# Patient Record
Sex: FEMALE | Race: OTHER | HISPANIC OR LATINO | Employment: FULL TIME | URBAN - METROPOLITAN AREA
[De-identification: names, ages, dates, MRNs, and addresses within clinical notes are randomized per-mention and may not be internally consistent; named-entity substitution may affect disease eponyms.]

---

## 2021-12-31 ENCOUNTER — HOSPITAL ENCOUNTER (EMERGENCY)
Facility: HOSPITAL | Age: 56
Discharge: HOME | End: 2021-12-31
Attending: EMERGENCY MEDICINE | Admitting: EMERGENCY MEDICINE

## 2021-12-31 VITALS
HEART RATE: 111 BPM | WEIGHT: 154.54 LBS | RESPIRATION RATE: 18 BRPM | SYSTOLIC BLOOD PRESSURE: 133 MMHG | TEMPERATURE: 97.9 F | DIASTOLIC BLOOD PRESSURE: 79 MMHG | OXYGEN SATURATION: 98 %

## 2021-12-31 DIAGNOSIS — Z20.822 COVID-19 VIRUS TEST RESULT UNKNOWN: ICD-10-CM

## 2021-12-31 DIAGNOSIS — J06.9 VIRAL URI WITH COUGH: Primary | ICD-10-CM

## 2021-12-31 LAB
FLUAV RNA RESP QL NAA+PROBE: NEGATIVE
FLUBV RNA RESP QL NAA+PROBE: NEGATIVE
RSV RNA RESP QL NAA+PROBE: NEGATIVE
SARS-COV-2 RNA RESP QL NAA+PROBE: POSITIVE

## 2021-12-31 PROCEDURE — 99284 EMERGENCY DEPT VISIT MOD MDM: CPT | Performed by: PHYSICIAN ASSISTANT

## 2021-12-31 PROCEDURE — 99283 EMERGENCY DEPT VISIT LOW MDM: CPT

## 2021-12-31 PROCEDURE — 0241U HB NFCT DS VIR RESP RNA 4 TRGT: CPT | Performed by: PHYSICIAN ASSISTANT

## 2021-12-31 RX ORDER — IBUPROFEN 400 MG/1
400 TABLET ORAL EVERY 6 HOURS PRN
Qty: 12 TABLET | Refills: 0 | Status: SHIPPED | OUTPATIENT
Start: 2021-12-31

## 2021-12-31 RX ORDER — GUAIFENESIN/DEXTROMETHORPHAN 100-10MG/5
5 SYRUP ORAL 3 TIMES DAILY PRN
Qty: 118 ML | Refills: 0 | Status: SHIPPED | OUTPATIENT
Start: 2021-12-31 | End: 2022-01-10

## 2021-12-31 RX ORDER — ACETAMINOPHEN 500 MG
500 TABLET ORAL EVERY 6 HOURS PRN
Qty: 30 TABLET | Refills: 0 | Status: SHIPPED | OUTPATIENT
Start: 2021-12-31

## 2021-12-31 RX ORDER — FLUTICASONE PROPIONATE 50 MCG
1 SPRAY, SUSPENSION (ML) NASAL DAILY
Qty: 16 G | Refills: 0 | Status: SHIPPED | OUTPATIENT
Start: 2021-12-31

## 2021-12-31 RX ORDER — ALBUTEROL SULFATE 90 UG/1
2 AEROSOL, METERED RESPIRATORY (INHALATION) EVERY 4 HOURS PRN
Qty: 6.7 G | Refills: 0 | Status: SHIPPED | OUTPATIENT
Start: 2021-12-31

## 2021-12-31 NOTE — ED PROVIDER NOTES
HPI: Patient is a 49 y o  female who presents with 3 days of chills, cough, headache, sore throat, fatigue and myalgias which the patient describes at moderate The patient has had contact with people with similar symptoms  The patient has not taken any medication  Patient IS TRIPLE Vaccinated for COVID-19  No Known Allergies    History reviewed  No pertinent past medical history  History reviewed  No pertinent surgical history  Social History     Tobacco Use    Smoking status: Never Smoker    Smokeless tobacco: Never Used   Substance Use Topics    Alcohol use: Never    Drug use: Never       Nursing notes reviewed  Physical Exam:  ED Triage Vitals [12/31/21 1515]   Temperature Pulse Respirations Blood Pressure SpO2   97 9 °F (36 6 °C) (!) 111 18 133/79 98 %      Temp Source Heart Rate Source Patient Position - Orthostatic VS BP Location FiO2 (%)   Tympanic Monitor Sitting Left arm --      Pain Score       --           ROS: Positive for cough, congestion, body aches, sore throat, the remainder of a 10 organ system ROS was otherwise unremarkable  General: awake, alert, no acute distress  Head: normocephalic, atraumatic  Eyes: no scleral icterus  Ears: external ears normal, hearing grossly intact  Nose: external exam grossly normal, positive nasal discharge  Neck: symmetric, No JVD noted, trachea midline  Pulmonary: no respiratory distress, no tachypnea noted  Cardiovascular: appears well perfused  Abdomen: no distention noted  Musculoskeletal: no deformities noted, tone normal  Neuro: grossly non-focal  Psych: mood and affect appropriate    The patient is stable and has a history and physical exam consistent with a viral illness  COVID19 testing has been performed  I considered the patient's other medical conditions as applicable/noted above in my medical decision making  The patient is stable upon discharge  The plan is for supportive care at home      The patient (and any family present) verbalized understanding of the discharge instructions and warnings that would necessitate return to the Emergency Department  All questions were answered prior to discharge  Medications - No data to display  Final diagnoses:   Viral URI with cough   COVID-19 virus test result unknown     Time reflects when diagnosis was documented in both MDM as applicable and the Disposition within this note     Time User Action Codes Description Comment    12/31/2021  3:20 PM Svitlana Herrera [J06 9] Viral URI with cough     12/31/2021  3:20 PM [facility] [Z20 [id number]] COVID-19 virus test result unknown       ED Disposition     ED Disposition Condition Date/Time Comment    Discharge Good Fri Dec 31, 2021  3:20 PM Jhoan Jones discharge to home/self care              Follow-up Information     Follow up With Specialties Details Why Yves Darby MD Family Medicine Schedule an appointment as soon as possible for a visit in 2 days As needed [phone] [phone]          Patient's Medications   Discharge Prescriptions    ACETAMINOPHEN (TYLENOL) 500 MG TABLET    Take 1 tablet (500 mg total) by mouth every 6 (six) hours as needed for mild pain       Start Date: 12/31/2021End Date: --       Order Dose: 500 mg       Quantity: 30 tablet    Refills: 0    ALBUTEROL (PROVENTIL HFA,VENTOLIN HFA) 90 MCG/ACT INHALER    Inhale 2 puffs every 4 (four) hours as needed for wheezing       Start Date: 12/31/2021End Date: --       Order Dose: 2 puffs       Quantity: 6 7 g    Refills: 0    DEXTROMETHORPHAN-GUAIFENESIN (ROBITUSSIN DM)  MG/5 ML SYRUP    Take 5 mL by mouth 3 (three) times a day as needed (cough) for up to 10 days       Start Date: 12/31/2021End Date: 1/10/2022       Order Dose: 5 mL       Quantity: 118 mL    Refills: 0    FLUTICASONE (FLONASE) 50 MCG/ACT NASAL SPRAY    1 spray into each nostril daily       Start Date: 12/31/2021End Date: --       Order Dose: 1 spray Quantity: 16 g    Refills: 0    IBUPROFEN (MOTRIN) 400 MG TABLET    Take 1 tablet (400 mg total) by mouth every 6 (six) hours as needed for mild pain       Start Date: 12/31/2021End Date: --       Order Dose: 400 mg       Quantity: 12 tablet    Refills: 0    MENTHOL-CETYLPYRIDINIUM (CEPACOL) 3 MG LOZENGE    Take 1 lozenge (3 mg total) by mouth as needed for sore throat       Start Date: 12/31/2021End Date: --       Order Dose: 3 mg       Quantity: 30 lozenge    Refills: 0     No discharge procedures on file      Electronically Signed by       Lisa Chaparro PA-C  12/31/21 2246

## 2023-01-01 ENCOUNTER — HOSPITAL ENCOUNTER (EMERGENCY)
Facility: HOSPITAL | Age: 58
Discharge: HOME | End: 2023-01-01
Attending: EMERGENCY MEDICINE

## 2023-01-01 ENCOUNTER — APPOINTMENT (EMERGENCY)
Dept: RADIOLOGY | Facility: HOSPITAL | Age: 58
End: 2023-01-01

## 2023-01-01 VITALS
WEIGHT: 152.34 LBS | DIASTOLIC BLOOD PRESSURE: 63 MMHG | HEART RATE: 89 BPM | OXYGEN SATURATION: 99 % | SYSTOLIC BLOOD PRESSURE: 121 MMHG | RESPIRATION RATE: 16 BRPM | TEMPERATURE: 98.2 F

## 2023-01-01 DIAGNOSIS — M25.532 LEFT WRIST PAIN: ICD-10-CM

## 2023-01-01 DIAGNOSIS — M79.642 LEFT HAND PAIN: Primary | ICD-10-CM

## 2023-01-01 RX ORDER — NAPROXEN 250 MG/1
500 TABLET ORAL ONCE
Status: COMPLETED | OUTPATIENT
Start: 2023-01-01 | End: 2023-01-01

## 2023-01-01 RX ADMIN — NAPROXEN SODIUM 500 MG: 250 TABLET ORAL at 18:02

## 2023-01-01 NOTE — ED PROVIDER NOTES
History  Chief Complaint   Patient presents with   • Hand Pain     Left hand pain since last night denies injury     56 y o  F p/w left hand/wrist pain x last night  Denies injury or repetitive movements  Better with Tylenol  Worse with movement and palpation  History provided by:  Patient  Hand Pain  Location:  Left  Onset quality:  Gradual  Duration:  1 day  Timing:  Constant  Progression:  Unchanged  Chronicity:  New  Relieved by:  Tylenol      Prior to Admission Medications   Prescriptions Last Dose Informant Patient Reported? Taking?   acetaminophen (TYLENOL) 500 mg tablet   No No   Sig: Take 1 tablet (500 mg total) by mouth every 6 (six) hours as needed for mild pain   albuterol (PROVENTIL HFA,VENTOLIN HFA) 90 mcg/act inhaler   No No   Sig: Inhale 2 puffs every 4 (four) hours as needed for wheezing   fluticasone (FLONASE) 50 mcg/act nasal spray   No No   Si spray into each nostril daily   ibuprofen (MOTRIN) 400 mg tablet   No No   Sig: Take 1 tablet (400 mg total) by mouth every 6 (six) hours as needed for mild pain   menthol-cetylpyridinium (CEPACOL) 3 MG lozenge   No No   Sig: Take 1 lozenge (3 mg total) by mouth as needed for sore throat      Facility-Administered Medications: None       History reviewed  No pertinent past medical history  History reviewed  No pertinent surgical history  History reviewed  No pertinent family history  I have reviewed and agree with the history as documented  E-Cigarette/Vaping     E-Cigarette/Vaping Substances     Social History     Tobacco Use   • Smoking status: Never   • Smokeless tobacco: Never   Substance Use Topics   • Alcohol use: Never   • Drug use: Never       Review of Systems   Musculoskeletal:        Left wrist/hand pain   Neurological: Negative for weakness and numbness  All other systems reviewed and are negative  Physical Exam  Physical Exam  Vitals and nursing note reviewed     Constitutional:       General: She is not in acute distress  Appearance: Normal appearance  She is not ill-appearing, toxic-appearing or diaphoretic  Interventions: She is not intubated  HENT:      Head: Normocephalic and atraumatic  No raccoon eyes  Right Ear: External ear normal       Left Ear: External ear normal       Nose: Nose normal    Eyes:      General: No scleral icterus  Conjunctiva/sclera:      Right eye: Right conjunctiva is not injected  Left eye: Left conjunctiva is not injected  Neck:      Vascular: No JVD  Trachea: Phonation normal  No tracheal deviation  Pulmonary:      Effort: Pulmonary effort is normal  No accessory muscle usage, respiratory distress or retractions  She is not intubated  Breath sounds: No stridor  Musculoskeletal:         General: No deformity  Left wrist: Tenderness present  No bony tenderness  Normal pulse  Left hand: Tenderness present  No swelling, deformity or lacerations  Decreased range of motion  Skin:     Coloration: Skin is not cyanotic or jaundiced  Neurological:      Mental Status: She is alert  GCS: GCS eye subscore is 4  GCS verbal subscore is 5  GCS motor subscore is 6  Cranial Nerves: No dysarthria  Motor: No seizure activity        Gait: Gait normal    Psychiatric:         Mood and Affect: Mood normal          Behavior: Behavior normal          Vital Signs  ED Triage Vitals [01/01/23 1727]   Temperature Pulse Respirations Blood Pressure SpO2   98 2 °F (36 8 °C) 89 16 121/63 99 %      Temp Source Heart Rate Source Patient Position - Orthostatic VS BP Location FiO2 (%)   Oral Monitor Sitting Right arm --      Pain Score       6           Vitals:    01/01/23 1727   BP: 121/63   Pulse: 89   Patient Position - Orthostatic VS: Sitting         Visual Acuity      ED Medications  Medications   naproxen (NAPROSYN) tablet 500 mg (500 mg Oral Given 1/1/23 1802)       Diagnostic Studies  Results Reviewed     None                 XR hand 3+ views LEFT   ED Interpretation by 2000 Roslyn Cruz DO (01/01 1758)   No fracture      XR wrist 3+ views LEFT   ED Interpretation by 2000 Roslyn Cruz DO (01/01 1758)   No fracture                 Procedures  Procedures         ED Course  ED Course as of 01/01/23 1807   Sun Jan 01, 2023 1805 Updated pt and spouse on negative xray results and instructed her to f/u with PCP  MDM    Disposition  Final diagnoses:   Left hand pain   Left wrist pain     Time reflects when diagnosis was documented in both MDM as applicable and the Disposition within this note     Time User Action Codes Description Comment    1/1/2023  5:57 PM Yves Ramirez [[id number]] Left hand pain     1/1/2023  5:57 PM Cira Sutton [M25 [id number]] Left wrist pain       ED Disposition     ED Disposition   Discharge    Condition   Stable    Date/Time   Sun Jan 1, 2023  5:58 PM    Comment   Jhoan Jones discharge to home/self care  Follow-up Information    None         Patient's Medications   Discharge Prescriptions    DICLOFENAC SODIUM (VOLTAREN) 50 MG EC TABLET    Take 1 tablet (50 mg total) by mouth 2 (two) times a day       Start Date: 1/1/2023  End Date: --       Order Dose: 50 mg       Quantity: 14 tablet    Refills: 0       No discharge procedures on file      PDMP Review     None          ED Provider  Electronically Signed by           2000 Roslyn Cruz DO  01/01/23 1807

## 2023-01-01 NOTE — Clinical Note
Bertha Gallo was seen and treated in our emergency department on 1/1/2023  Diagnosis:     Prasad Hopper  may return to work on return date  She may return on this date: 01/03/2023         If you have any questions or concerns, please don't hesitate to call        [address], DO    ______________________________           _______________          _______________  [facility] Representative                              Date                                Time

## 2023-01-27 ENCOUNTER — APPOINTMENT (EMERGENCY)
Dept: RADIOLOGY | Facility: HOSPITAL | Age: 58
End: 2023-01-27

## 2023-01-27 ENCOUNTER — HOSPITAL ENCOUNTER (EMERGENCY)
Facility: HOSPITAL | Age: 58
Discharge: HOME | End: 2023-01-27
Attending: EMERGENCY MEDICINE

## 2023-01-27 VITALS
HEART RATE: 94 BPM | TEMPERATURE: 98.5 F | BODY MASS INDEX: 25.22 KG/M2 | HEIGHT: 64 IN | SYSTOLIC BLOOD PRESSURE: 143 MMHG | DIASTOLIC BLOOD PRESSURE: 65 MMHG | OXYGEN SATURATION: 99 % | RESPIRATION RATE: 16 BRPM | WEIGHT: 147.71 LBS

## 2023-01-27 DIAGNOSIS — R05.2 SUBACUTE COUGH: ICD-10-CM

## 2023-01-27 DIAGNOSIS — J10.1 INFLUENZA A: Primary | ICD-10-CM

## 2023-01-27 LAB
FLUAV RNA RESP QL NAA+PROBE: POSITIVE
FLUBV RNA RESP QL NAA+PROBE: NEGATIVE
RSV RNA RESP QL NAA+PROBE: NEGATIVE
SARS-COV-2 RNA RESP QL NAA+PROBE: NEGATIVE

## 2023-01-27 RX ORDER — BENZONATATE 100 MG/1
100 CAPSULE ORAL EVERY 8 HOURS
Qty: 21 CAPSULE | Refills: 0 | Status: SHIPPED | OUTPATIENT
Start: 2023-01-27 | End: 2023-01-27 | Stop reason: SDUPTHER

## 2023-01-27 RX ORDER — ACETAMINOPHEN 500 MG
500 TABLET ORAL EVERY 6 HOURS PRN
Qty: 30 TABLET | Refills: 0 | Status: SHIPPED | OUTPATIENT
Start: 2023-01-27

## 2023-01-27 RX ORDER — BENZONATATE 100 MG/1
100 CAPSULE ORAL EVERY 8 HOURS
Qty: 21 CAPSULE | Refills: 0 | Status: SHIPPED | OUTPATIENT
Start: 2023-01-27

## 2023-01-27 RX ORDER — ALBUTEROL SULFATE 90 UG/1
1-2 AEROSOL, METERED RESPIRATORY (INHALATION) EVERY 6 HOURS PRN
Qty: 8 G | Refills: 0 | Status: SHIPPED | OUTPATIENT
Start: 2023-01-27

## 2023-01-27 RX ORDER — IBUPROFEN 400 MG/1
400 TABLET ORAL EVERY 6 HOURS PRN
Qty: 30 TABLET | Refills: 0 | Status: SHIPPED | OUTPATIENT
Start: 2023-01-27

## 2023-01-27 RX ORDER — ALBUTEROL SULFATE 90 UG/1
1-2 AEROSOL, METERED RESPIRATORY (INHALATION) EVERY 6 HOURS PRN
Qty: 8 G | Refills: 0 | Status: SHIPPED | OUTPATIENT
Start: 2023-01-27 | End: 2023-01-27 | Stop reason: SDUPTHER

## 2023-01-27 RX ORDER — ALBUTEROL SULFATE 90 UG/1
2 AEROSOL, METERED RESPIRATORY (INHALATION) ONCE
Status: COMPLETED | OUTPATIENT
Start: 2023-01-27 | End: 2023-01-27

## 2023-01-27 RX ADMIN — ALBUTEROL SULFATE 2 PUFF: 90 AEROSOL, METERED RESPIRATORY (INHALATION) at 16:42

## 2023-01-27 NOTE — ED PROVIDER NOTES
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 Taking?   acetaminophen (TYLENOL) 500 mg tablet   No No   Sig: Take 1 tablet (500 mg total) by mouth every 6 (six) hours as needed for mild pain   albuterol (PROVENTIL HFA,VENTOLIN HFA) 90 mcg/act inhaler   No No   Sig: Inhale 2 puffs every 4 (four) hours as needed for wheezing   diclofenac sodium (VOLTAREN) 50 mg EC tablet   No No   Sig: Take 1 tablet (50 mg total) by mouth 2 (two) times a day   fluticasone (FLONASE) 50 mcg/act nasal spray   No No   Si spray into each nostril daily   ibuprofen (MOTRIN) 400 mg tablet   No No   Sig: Take 1 tablet (400 mg total) by mouth every 6 (six) hours as needed for mild pain   menthol-cetylpyridinium (CEPACOL) 3 MG lozenge   No No   Sig: Take 1 lozenge (3 mg total) by mouth as needed for sore throat      Facility-Administered Medications: None       Past Medical History:   Diagnosis Date   • Asthma        Past Surgical History:   Procedure Laterality Date   •  SECTION         History reviewed  No pertinent family history  I have reviewed and agree with the history as documented      E-Cigarette/Vaping   • E-Cigarette Use Never User      E-Cigarette/Vaping Substances     Social History     Tobacco Use   • Smoking status: Never   • Smokeless tobacco: Never   Vaping Use   • Vaping Use: Never used   Substance Use Topics   • Alcohol use: Never   • Drug use: Never       Review of Systems   Constitutional: Negative for chills and fever  HENT: Positive for congestion  Negative for ear pain and sore throat  Eyes: Negative for pain and visual disturbance  Respiratory: Positive for cough  Negative for apnea, choking, chest tightness, shortness of breath and wheezing  Cardiovascular: Negative for chest pain and palpitations  Gastrointestinal: Negative for abdominal pain and vomiting  Genitourinary: Negative for dysuria and hematuria  Musculoskeletal: Positive for myalgias  Negative for arthralgias and back pain  Skin: Negative for color change and rash  Neurological: Negative for seizures and syncope  All other systems reviewed and are negative  Physical Exam  Physical Exam  Vitals and nursing note reviewed  Constitutional:       General: She is not in acute distress  Appearance: She is well-developed  She is not ill-appearing, toxic-appearing or diaphoretic  HENT:      Head: Normocephalic and atraumatic  Right Ear: Tympanic membrane normal       Left Ear: Tympanic membrane normal       Mouth/Throat:      Pharynx: Posterior oropharyngeal erythema present  No oropharyngeal exudate  Eyes:      Conjunctiva/sclera: Conjunctivae normal    Cardiovascular:      Rate and Rhythm: Normal rate and regular rhythm  Heart sounds: No murmur heard  Pulmonary:      Effort: Pulmonary effort is normal  No respiratory distress  Breath sounds: Normal breath sounds  No stridor  No wheezing, rhonchi or rales  Abdominal:      Palpations: Abdomen is soft  Tenderness: There is no abdominal tenderness  Musculoskeletal:         General: No swelling  Cervical back: Neck supple  No tenderness  Lymphadenopathy:      Cervical: No cervical adenopathy     Skin:     General: Skin is warm and dry       Capillary Refill: Capillary refill takes less than 2 seconds  Neurological:      Mental Status: She is alert  Psychiatric:         Mood and Affect: Mood normal          Vital Signs  ED Triage Vitals [01/27/23 1417]   Temperature Pulse Respirations Blood Pressure SpO2   98 5 °F (36 9 °C) 104 16 143/65 99 %      Temp src Heart Rate Source Patient Position - Orthostatic VS BP Location FiO2 (%)   -- -- -- -- --      Pain Score       4           Vitals:    01/27/23 1417 01/27/23 1528   BP: 143/65    Pulse: 104 94         Visual Acuity      ED Medications  Medications - No data to display    Diagnostic Studies  Results Reviewed     Procedure Component Value Units Date/Time    FLU/RSV/COVID - if FLU/RSV clinically relevant [[id number]]  (Abnormal) Collected: 01/27/23 1531    Lab Status: Final result Specimen: Nares from Nose Updated: 01/27/23 1628     SARS-CoV-2 Negative     INFLUENZA A PCR Positive     INFLUENZA B PCR Negative     RSV PCR Negative    Narrative:      FOR PEDIATRIC PATIENTS - copy/paste COVID Guidelines URL to browser: [email or web address]  [email or web address]    SARS-CoV-2 assay is a Nucleic Acid Amplification assay intended for the  qualitative detection of nucleic acid from SARS-CoV-2 in nasopharyngeal  swabs  Results are for the presumptive identification of SARS-CoV-2 RNA  Positive results are indicative of infection with SARS-CoV-2, the virus  causing COVID-19, but do not rule out bacterial infection or co-infection  with other viruses  Laboratories within the [place] and its  territories are required to report all positive results to the appropriate  public health authorities  Negative results do not preclude SARS-CoV-2  infection and should not be used as the sole basis for treatment or other  patient management decisions  Negative results must be combined with  clinical observations, patient history, and epidemiological information    This test has not been FDA cleared or approved  This test has been authorized by FDA under an Emergency Use Authorization  (EUA)  This test is only authorized for the duration of time the  declaration that circumstances exist justifying the authorization of the  emergency use of an in vitro diagnostic tests for detection of SARS-CoV-2  virus and/or diagnosis of COVID-19 infection under section 564(b)(1) of  the Act, 21 U  S C  003IPU-4(E)(8), unless the authorization is terminated  or revoked sooner  The test has been validated but independent review by FDA  and CLIA is pending  Test performed using [employer] Gene[employer]pert: This RT-PCR assay targets N2,  a region unique to SARS-CoV-2  A conserved region in the E-gene was chosen  for pan-Sarbecovirus detection which includes SARS-CoV-2  According to CMS-2020-01-R, this platform meets the definition of high-throughput technology  XR chest 2 views   ED Interpretation by Olamide Rodriguez PA-C (01/27 5900)   No obvious acute cardiopulmonary abnormalities when read by me  Procedures  Procedures         ED Course                               SBIRT 20yo+    Flowsheet Row Most Recent Value   SBIRT (25 yo +)    In order to provide better care to our patients, we are screening all of our patients for alcohol and drug use  Would it be okay to ask you these screening questions? No Filed at: 01/27/2023 1510                    Medical Decision Making  Ruth Johnson is a 56 y o  female with a past medical history of asthma presenting to the ER complaining of cough x3 weeks  Patient reports associated congestion, throat pain when coughing, body aches, and fatigue  No chest pain or shortness of breath  On exam patient well-appearing in no acute distress  Vital signs are within normal limits  Physical examination reveals mild pharyngeal erythema but no swelling or exudates  Lungs clear to auscultation    Considering cough, lack of cervical adenopathy, and lack of fever, do not suspect strep pharyngitis  Suspect viral upper respiratory infection  Will order COVID/flu/RSV swab and chest x-ray to rule out pneumonia  Patient is understanding and agreeable with plan  Chest x-ray reveals no obvious acute cardiopulmonary abnormalities when read by me  Swab came back as positive for influenza A  Discussed these results with patient  Extensively discussed appropriate supportive care at home  Sent prescriptions for Tylenol, Motrin, a refill for albuterol, and benzonatate  Advise close follow-up with PCP  Strict return precautions to the ED including but limited to worsening cough, shortness of breath, chest pain, difficulty breathing, or any other new or concerning symptoms  Patient is understanding and agreeable with plan  Patient has remained stable throughout stay in ED and stable upon discharge  Influenza A: acute illness or injury  Subacute cough: acute illness or injury  Amount and/or Complexity of Data Reviewed  Labs: ordered  Decision-making details documented in ED Course  Radiology: ordered and independent interpretation performed  Decision-making details documented in ED Course  Risk  OTC drugs  Prescription drug management  Disposition  Final diagnoses:   Influenza A   Subacute cough     Time reflects when diagnosis was documented in both MDM as applicable and the Disposition within this note     Time User Action Codes Description Comment    1/27/2023  4:31 PM Amisha Swenson Add [J10 1] Influenza A     1/27/2023  4:31 PM Amisha Swenson Add [R05 2] Subacute cough       ED Disposition     ED Disposition   Discharge    Condition   Stable    Date/Time   Fri Jan 27, 2023  4:28 PM    Comment   Jhoan Jones discharge to home/self care                 Follow-up Information     Follow up With Specialties Details Why Contact Info Additional [address]   [address] [facility] [address] [facility]  [address] [facility] Emergency Department Emergency Medicine  If symptoms worsen [place] [phone]  [address] [facility] Emergency Department, [address] [facility] [address]  , [place], [place], [address]          Patient's Medications   Discharge Prescriptions    ACETAMINOPHEN (TYLENOL) 500 MG TABLET    Take 1 tablet (500 mg total) by mouth every 6 (six) hours as needed for mild pain       Start Date: 1/27/2023 End Date: --       Order Dose: 500 mg       Quantity: 30 tablet    Refills: 0    ALBUTEROL (PROVENTIL HFA) 90 MCG/ACT INHALER    Inhale 1-2 puffs every 6 (six) hours as needed for wheezing       Start Date: 1/27/2023 End Date: --       Order Dose: 1-2 puffs       Quantity: 8 g    Refills: 0    BENZONATATE (TESSALON PERLES) 100 MG CAPSULE    Take 1 capsule (100 mg total) by mouth every 8 (eight) hours       Start Date: 1/27/2023 End Date: --       Order Dose: 100 mg       Quantity: 21 capsule    Refills: 0    IBUPROFEN (MOTRIN) 400 MG TABLET    Take 1 tablet (400 mg total) by mouth every 6 (six) hours as needed for mild pain       Start Date: 1/27/2023 End Date: --       Order Dose: 400 mg       Quantity: 30 tablet    Refills: 0       No discharge procedures on file      PDMP Review     None          ED Provider  Electronically Signed by           Danilo Olivarez PA-C  01/27/23 1039 [facility]  01/27/23 6312

## 2023-01-27 NOTE — Clinical Note
Kenneth Stallworth was seen and treated in our emergency department on 1/27/2023  Diagnosis:     Ibelisse    She may return on this date: If you have any questions or concerns, please don't hesitate to call        Autumn Diaz PA-C    ______________________________           _______________          _______________  Hospital Representative                              Date                                Time

## 2023-05-27 ENCOUNTER — HOSPITAL ENCOUNTER (EMERGENCY)
Facility: HOSPITAL | Age: 58
Discharge: HOME | End: 2023-05-27
Attending: EMERGENCY MEDICINE | Admitting: EMERGENCY MEDICINE

## 2023-05-27 ENCOUNTER — APPOINTMENT (EMERGENCY)
Dept: RADIOLOGY | Facility: HOSPITAL | Age: 58
End: 2023-05-27

## 2023-05-27 VITALS
TEMPERATURE: 98.1 F | RESPIRATION RATE: 18 BRPM | OXYGEN SATURATION: 96 % | DIASTOLIC BLOOD PRESSURE: 73 MMHG | HEART RATE: 108 BPM | SYSTOLIC BLOOD PRESSURE: 135 MMHG

## 2023-05-27 DIAGNOSIS — R53.83 FATIGUE: Primary | ICD-10-CM

## 2023-05-27 DIAGNOSIS — J06.9 UPPER RESPIRATORY INFECTION: ICD-10-CM

## 2023-05-27 LAB
ALBUMIN SERPL BCP-MCNC: 3.4 G/DL (ref 3.5–5)
ALP SERPL-CCNC: 49 U/L (ref 34–104)
ALT SERPL W P-5'-P-CCNC: 14 U/L (ref 7–52)
ANION GAP SERPL CALCULATED.3IONS-SCNC: 5 MMOL/L (ref 4–13)
AST SERPL W P-5'-P-CCNC: 25 U/L (ref 13–39)
ATRIAL RATE: 111 BPM
BASOPHILS # BLD AUTO: 0.05 THOUSANDS/ÂΜL (ref 0–0.1)
BASOPHILS NFR BLD AUTO: 1 % (ref 0–1)
BILIRUB SERPL-MCNC: 0.32 MG/DL (ref 0.2–1)
BUN SERPL-MCNC: 11 MG/DL (ref 5–25)
CALCIUM ALBUM COR SERPL-MCNC: 9.9 MG/DL (ref 8.3–10.1)
CALCIUM SERPL-MCNC: 9.4 MG/DL (ref 8.4–10.2)
CARDIAC TROPONIN I PNL SERPL HS: 3 NG/L
CHLORIDE SERPL-SCNC: 99 MMOL/L (ref 96–108)
CO2 SERPL-SCNC: 30 MMOL/L (ref 21–32)
CREAT SERPL-MCNC: 1.13 MG/DL (ref 0.6–1.3)
EOSINOPHIL # BLD AUTO: 1.79 THOUSAND/ÂΜL (ref 0–0.61)
EOSINOPHIL NFR BLD AUTO: 18 % (ref 0–6)
ERYTHROCYTE [DISTWIDTH] IN BLOOD BY AUTOMATED COUNT: 12.4 % (ref 11.6–15.1)
FLUAV RNA RESP QL NAA+PROBE: NEGATIVE
FLUBV RNA RESP QL NAA+PROBE: NEGATIVE
GFR SERPL CREATININE-BSD FRML MDRD: 54 ML/MIN/1.73SQ M
GLUCOSE SERPL-MCNC: 105 MG/DL (ref 65–140)
HCT VFR BLD AUTO: 38.8 % (ref 34.8–46.1)
HGB BLD-MCNC: 12.3 G/DL (ref 11.5–15.4)
IMM GRANULOCYTES # BLD AUTO: 0.04 THOUSAND/UL (ref 0–0.2)
IMM GRANULOCYTES NFR BLD AUTO: 0 % (ref 0–2)
LACTATE SERPL-SCNC: 0.8 MMOL/L (ref 0.5–2)
LYMPHOCYTES # BLD AUTO: 1.58 THOUSANDS/ÂΜL (ref 0.6–4.47)
LYMPHOCYTES NFR BLD AUTO: 16 % (ref 14–44)
MCH RBC QN AUTO: 29 PG (ref 26.8–34.3)
MCHC RBC AUTO-ENTMCNC: 31.7 G/DL (ref 31.4–37.4)
MCV RBC AUTO: 92 FL (ref 82–98)
MONOCYTES # BLD AUTO: 0.32 THOUSAND/ÂΜL (ref 0.17–1.22)
MONOCYTES NFR BLD AUTO: 3 % (ref 4–12)
NEUTROPHILS # BLD AUTO: 6.27 THOUSANDS/ÂΜL (ref 1.85–7.62)
NEUTS SEG NFR BLD AUTO: 62 % (ref 43–75)
NRBC BLD AUTO-RTO: 0 /100 WBCS
P AXIS: 51 DEGREES
PLATELET # BLD AUTO: 568 THOUSANDS/UL (ref 149–390)
PMV BLD AUTO: 9.6 FL (ref 8.9–12.7)
POTASSIUM SERPL-SCNC: 5.4 MMOL/L (ref 3.5–5.3)
PR INTERVAL: 132 MS
PROT SERPL-MCNC: 8.2 G/DL (ref 6.4–8.4)
QRS AXIS: 70 DEGREES
QRSD INTERVAL: 78 MS
QT INTERVAL: 310 MS
QTC INTERVAL: 421 MS
RBC # BLD AUTO: 4.24 MILLION/UL (ref 3.81–5.12)
RSV RNA RESP QL NAA+PROBE: NEGATIVE
SARS-COV-2 RNA RESP QL NAA+PROBE: NEGATIVE
SODIUM SERPL-SCNC: 134 MMOL/L (ref 135–147)
T WAVE AXIS: 61 DEGREES
VENTRICULAR RATE: 111 BPM
WBC # BLD AUTO: 10.05 THOUSAND/UL (ref 4.31–10.16)

## 2023-05-27 RX ORDER — DEXAMETHASONE SODIUM PHOSPHATE 10 MG/ML
10 INJECTION, SOLUTION INTRAMUSCULAR; INTRAVENOUS ONCE
Status: COMPLETED | OUTPATIENT
Start: 2023-05-27 | End: 2023-05-27

## 2023-05-27 RX ORDER — AZITHROMYCIN 250 MG/1
TABLET, FILM COATED ORAL
Qty: 6 TABLET | Refills: 0 | Status: SHIPPED | OUTPATIENT
Start: 2023-05-27 | End: 2023-05-31

## 2023-05-27 RX ORDER — OXYMETAZOLINE HYDROCHLORIDE 0.05 G/100ML
2 SPRAY NASAL ONCE
Status: COMPLETED | OUTPATIENT
Start: 2023-05-27 | End: 2023-05-27

## 2023-05-27 RX ORDER — ONDANSETRON 2 MG/ML
4 INJECTION INTRAMUSCULAR; INTRAVENOUS ONCE
Status: COMPLETED | OUTPATIENT
Start: 2023-05-27 | End: 2023-05-27

## 2023-05-27 RX ORDER — KETOROLAC TROMETHAMINE 30 MG/ML
15 INJECTION, SOLUTION INTRAMUSCULAR; INTRAVENOUS ONCE
Status: COMPLETED | OUTPATIENT
Start: 2023-05-27 | End: 2023-05-27

## 2023-05-27 RX ADMIN — KETOROLAC TROMETHAMINE 15 MG: 30 INJECTION, SOLUTION INTRAMUSCULAR; INTRAVENOUS at 07:05

## 2023-05-27 RX ADMIN — ONDANSETRON 4 MG: 2 INJECTION INTRAMUSCULAR; INTRAVENOUS at 07:03

## 2023-05-27 RX ADMIN — DEXAMETHASONE SODIUM PHOSPHATE 10 MG: 10 INJECTION INTRAMUSCULAR; INTRAVENOUS at 07:07

## 2023-05-27 RX ADMIN — SODIUM CHLORIDE 1000 ML: 0.9 INJECTION, SOLUTION INTRAVENOUS at 07:24

## 2023-05-27 RX ADMIN — OXYMETAZOLINE HYDROCHLORIDE 2 SPRAY: 0.05 SPRAY NASAL at 07:09

## 2023-05-27 NOTE — ED PROVIDER NOTES
History  Chief Complaint   Patient presents with   • Weakness - Generalized     Reports cough and weakness, seen for similar on Tuesday and dx with virus, pt reports still feeling weak     Thelma Benjamín is a very pleasant 56-year-old female who comes to the emergency department today for evaluation of multiple complaints  She states that for the past week or so she has been ill with multiple symptoms including nasal congestion, sore throat, cough, fatigue/generalized weakness, and general malaise  She was seen in emergency department in [place] on 5/23 and diagnosed with a viral illness  She was given Tylenol  I reviewed the emergency department note from that visit, she had a chest x-ray which showed some right-sided opacities thought to be atelectasis (I was able to review the report but not the actual images )  No other diagnostic testing was done  Patient states that she has continued to feel ill since that time  She is now also having some nausea/epigastric discomfort  She continues to have cough and is having difficulty sleeping  She reports decreased appetite but no vomiting  History provided by:  Patient      Prior to Admission Medications   Prescriptions Last Dose Informant Patient Reported?  Taking?   acetaminophen (TYLENOL) 500 mg tablet   No No   Sig: Take 1 tablet (500 mg total) by mouth every 6 (six) hours as needed for mild pain   acetaminophen (TYLENOL) 500 mg tablet   No No   Sig: Take 1 tablet (500 mg total) by mouth every 6 (six) hours as needed for mild pain   albuterol (PROVENTIL HFA,VENTOLIN HFA) 90 mcg/act inhaler   No No   Sig: Inhale 2 puffs every 4 (four) hours as needed for wheezing   albuterol (Proventil HFA) 90 mcg/act inhaler   No No   Sig: Inhale 1-2 puffs every 6 (six) hours as needed for wheezing   benzonatate (TESSALON PERLES) 100 mg capsule   No No   Sig: Take 1 capsule (100 mg total) by mouth every 8 (eight) hours   diclofenac sodium (VOLTAREN) 50 mg EC tablet   No No Sig: Take 1 tablet (50 mg total) by mouth 2 (two) times a day   fluticasone (FLONASE) 50 mcg/act nasal spray   No No   Si spray into each nostril daily   ibuprofen (MOTRIN) 400 mg tablet   No No   Sig: Take 1 tablet (400 mg total) by mouth every 6 (six) hours as needed for mild pain   ibuprofen (MOTRIN) 400 mg tablet   No No   Sig: Take 1 tablet (400 mg total) by mouth every 6 (six) hours as needed for mild pain   menthol-cetylpyridinium (CEPACOL) 3 MG lozenge   No No   Sig: Take 1 lozenge (3 mg total) by mouth as needed for sore throat      Facility-Administered Medications: None       Past Medical History:   Diagnosis Date   • Asthma        Past Surgical History:   Procedure Laterality Date   •  SECTION         History reviewed  No pertinent family history  I have reviewed and agree with the history as documented  E-Cigarette/Vaping   • E-Cigarette Use Never User      E-Cigarette/Vaping Substances     Social History     Tobacco Use   • Smoking status: Never   • Smokeless tobacco: Never   Vaping Use   • Vaping Use: Never used   Substance Use Topics   • Alcohol use: Never   • Drug use: Never       Review of Systems   Constitutional: Negative for chills and fever  HENT: Negative for sore throat  Respiratory: Negative for cough and shortness of breath  Cardiovascular: Negative for chest pain and palpitations  Gastrointestinal: Negative for abdominal pain, nausea and vomiting  Genitourinary: Negative for dysuria and hematuria  Musculoskeletal: Negative for arthralgias and back pain  Skin: Negative for color change and rash  Neurological: Negative for seizures and syncope  All other systems reviewed and are negative  Physical Exam  Physical Exam  Vitals and nursing note reviewed  Constitutional:       General: She is not in acute distress  Appearance: She is well-developed  HENT:      Head: Normocephalic and atraumatic  Nose: Congestion present     Eyes: Conjunctiva/sclera: Conjunctivae normal    Cardiovascular:      Rate and Rhythm: Normal rate and regular rhythm  Heart sounds: No murmur heard  Pulmonary:      Effort: Pulmonary effort is normal  No respiratory distress  Breath sounds: Wheezing present  Comments: Few scattered wheezes, worse on the right  No true aspiratory distress at rest on room air  Abdominal:      Palpations: Abdomen is soft  Tenderness: There is no abdominal tenderness  Musculoskeletal:         General: No swelling  Cervical back: Neck supple  Skin:     General: Skin is warm and dry  Capillary Refill: Capillary refill takes less than 2 seconds  Neurological:      General: No focal deficit present  Mental Status: She is alert and oriented to person, place, and time     Psychiatric:         Mood and Affect: Mood normal          Vital Signs  ED Triage Vitals [05/27/23 0618]   Temperature Pulse Respirations Blood Pressure SpO2   98 1 °F (36 7 °C) (!) 117 16 129/71 96 %      Temp src Heart Rate Source Patient Position - Orthostatic VS BP Location FiO2 (%)   -- -- Sitting Right arm --      Pain Score       --           Vitals:    05/27/23 0618   BP: 129/71   Pulse: (!) 117   Patient Position - Orthostatic VS: Sitting         Visual Acuity      ED Medications  Medications   oxymetazoline (AFRIN) 0 05 % nasal spray 2 spray (has no administration in time range)   dexamethasone (PF) (DECADRON) injection 10 mg (has no administration in time range)   sodium chloride 0 9 % bolus 1,000 mL (has no administration in time range)   ketorolac (TORADOL) injection 15 mg (has no administration in time range)   ondansetron (ZOFRAN) injection 4 mg (has no administration in time range)       Diagnostic Studies  Results Reviewed     Procedure Component Value Units Date/Time    CBC and differential [[id number]]     Lab Status: No result Specimen: Blood     Comprehensive metabolic panel [[id number]]     Lab Status: No result Specimen: Blood     FLU/RSV/COVID - if FLU/RSV clinically relevant [[id number]]     Lab Status: No result Specimen: Nares from Nose     POCT urinalysis dipstick [[id number]]     Lab Status: No result Specimen: Urine     HS Troponin 0hr (reflex protocol) [[id number]]     Lab Status: No result Specimen: Blood     Lactic acid, plasma (w/reflex if result > 2 0) [[id number]]     Lab Status: No result Specimen: Blood                  XR chest 2 views    (Results Pending)              Procedures  Procedures         ED Course  ED Course as of 05/27/23 0756   Sat May 27, 2023   0656 EKG reviewed by me, sinus tachycardia rate of 111, otherwise normal axis, normal intervals, no acute ST changes to suggest cardiac ischemia  Sinus tachycardia, otherwise normal EKG  Medical Decision Making  26-year-old female with upper respiratory symptoms for about a week  No documented fevers  Now with some nausea  Treated symptomatically in the ED  We will add antibiotics given duration of symptoms and questionable right lower lobe infiltrate  Recommend outpatient follow-up and return precautions  Fatigue: acute illness or injury  Upper respiratory infection: acute illness or injury  Amount and/or Complexity of Data Reviewed  External Data Reviewed: notes  Details: Reviewed EM notes from 5/23 visit to [facility]  Patient had chest x-ray that was read as questionable right lower lobe opacities likely atelectasis versus infection  She was diagnosed with viral bronchitis and given Tylenol  Labs: ordered  Decision-making details documented in ED Course  Radiology: ordered and independent interpretation performed  Details: Questionable right lower lobe opacities seem relatively stable compared to previous  No obvious pneumonia but cannot be fully ruled out given clinical presentation  Risk  OTC drugs  Prescription drug management            Disposition  Final diagnoses:   None ED Disposition     None      Follow-up Information    None         Patient's Medications   Discharge Prescriptions    No medications on file       No discharge procedures on file      PDMP Review     None          ED Provider  Electronically Signed by           Maria Esther Quintanilla MD  05/27/23 1054

## 2023-05-27 NOTE — Clinical Note
Martha Morenocory was seen and treated in our emergency department on 5/27/2023  Diagnosis:     Gita Bello  may return to work on return date  She may return on this date: 05/30/2023         If you have any questions or concerns, please don't hesitate to call        Kristin Wolff MD    ______________________________           _______________          _______________  Hospital Representative                              Date                                Time

## 2023-05-27 NOTE — ED NOTES
Patient unable to provide urine sample at this time  Per provider, ok to discharge without collecting specimen        Kit Wasserman RN  05/27/23 0315

## 2023-06-03 ENCOUNTER — HOSPITAL ENCOUNTER (EMERGENCY)
Facility: HOSPITAL | Age: 58
Discharge: HOME | End: 2023-06-03
Attending: EMERGENCY MEDICINE | Admitting: EMERGENCY MEDICINE
Payer: COMMERCIAL

## 2023-06-03 ENCOUNTER — APPOINTMENT (EMERGENCY)
Dept: RADIOLOGY | Facility: HOSPITAL | Age: 58
End: 2023-06-03
Payer: COMMERCIAL

## 2023-06-03 ENCOUNTER — APPOINTMENT (EMERGENCY)
Dept: CT IMAGING | Facility: HOSPITAL | Age: 58
End: 2023-06-03
Payer: COMMERCIAL

## 2023-06-03 VITALS
SYSTOLIC BLOOD PRESSURE: 130 MMHG | OXYGEN SATURATION: 95 % | DIASTOLIC BLOOD PRESSURE: 67 MMHG | TEMPERATURE: 98.3 F | WEIGHT: 138.01 LBS | BODY MASS INDEX: 23.69 KG/M2 | RESPIRATION RATE: 18 BRPM | HEART RATE: 104 BPM

## 2023-06-03 DIAGNOSIS — R10.13 EPIGASTRIC PAIN: ICD-10-CM

## 2023-06-03 DIAGNOSIS — R53.1 GENERALIZED WEAKNESS: Primary | ICD-10-CM

## 2023-06-03 DIAGNOSIS — R11.0 NAUSEA: ICD-10-CM

## 2023-06-03 DIAGNOSIS — K29.70 GASTRITIS: ICD-10-CM

## 2023-06-03 DIAGNOSIS — R91.1 PULMONARY NODULE: ICD-10-CM

## 2023-06-03 DIAGNOSIS — R93.89 THICKENED ENDOMETRIUM: ICD-10-CM

## 2023-06-03 LAB
ALBUMIN SERPL BCP-MCNC: 3.3 G/DL (ref 3.5–5)
ALP SERPL-CCNC: 44 U/L (ref 34–104)
ALT SERPL W P-5'-P-CCNC: 15 U/L (ref 7–52)
ANION GAP SERPL CALCULATED.3IONS-SCNC: 6 MMOL/L (ref 4–13)
AST SERPL W P-5'-P-CCNC: 17 U/L (ref 13–39)
BACTERIA UR QL AUTO: ABNORMAL /HPF
BASOPHILS # BLD AUTO: 0.05 THOUSANDS/ÂΜL (ref 0–0.1)
BASOPHILS NFR BLD AUTO: 1 % (ref 0–1)
BILIRUB SERPL-MCNC: 0.24 MG/DL (ref 0.2–1)
BILIRUB UR QL STRIP: NEGATIVE
BUDDING YEAST: PRESENT
BUN SERPL-MCNC: 24 MG/DL (ref 5–25)
CALCIUM ALBUM COR SERPL-MCNC: 9.8 MG/DL (ref 8.3–10.1)
CALCIUM SERPL-MCNC: 9.2 MG/DL (ref 8.4–10.2)
CARDIAC TROPONIN I PNL SERPL HS: 4 NG/L
CHLORIDE SERPL-SCNC: 98 MMOL/L (ref 96–108)
CLARITY UR: ABNORMAL
CO2 SERPL-SCNC: 29 MMOL/L (ref 21–32)
COLOR UR: YELLOW
CREAT SERPL-MCNC: 1.52 MG/DL (ref 0.6–1.3)
D DIMER PPP FEU-MCNC: 2.44 UG/ML FEU
EOSINOPHIL # BLD AUTO: 1.55 THOUSAND/ÂΜL (ref 0–0.61)
EOSINOPHIL NFR BLD AUTO: 14 % (ref 0–6)
ERYTHROCYTE [DISTWIDTH] IN BLOOD BY AUTOMATED COUNT: 12.3 % (ref 11.6–15.1)
GFR SERPL CREATININE-BSD FRML MDRD: 38 ML/MIN/1.73SQ M
GLUCOSE SERPL-MCNC: 86 MG/DL (ref 65–140)
GLUCOSE UR STRIP-MCNC: NEGATIVE MG/DL
HCT VFR BLD AUTO: 33.8 % (ref 34.8–46.1)
HGB BLD-MCNC: 10.9 G/DL (ref 11.5–15.4)
HGB UR QL STRIP.AUTO: ABNORMAL
IMM GRANULOCYTES # BLD AUTO: 0.07 THOUSAND/UL (ref 0–0.2)
IMM GRANULOCYTES NFR BLD AUTO: 1 % (ref 0–2)
KETONES UR STRIP-MCNC: NEGATIVE MG/DL
LEUKOCYTE ESTERASE UR QL STRIP: ABNORMAL
LIPASE SERPL-CCNC: 17 U/L (ref 11–82)
LYMPHOCYTES # BLD AUTO: 3.11 THOUSANDS/ÂΜL (ref 0.6–4.47)
LYMPHOCYTES NFR BLD AUTO: 28 % (ref 14–44)
MCH RBC QN AUTO: 29.2 PG (ref 26.8–34.3)
MCHC RBC AUTO-ENTMCNC: 32.2 G/DL (ref 31.4–37.4)
MCV RBC AUTO: 91 FL (ref 82–98)
MONOCYTES # BLD AUTO: 0.64 THOUSAND/ÂΜL (ref 0.17–1.22)
MONOCYTES NFR BLD AUTO: 6 % (ref 4–12)
NEUTROPHILS # BLD AUTO: 5.67 THOUSANDS/ÂΜL (ref 1.85–7.62)
NEUTS SEG NFR BLD AUTO: 50 % (ref 43–75)
NITRITE UR QL STRIP: NEGATIVE
NON-SQ EPI CELLS URNS QL MICRO: ABNORMAL /HPF
NRBC BLD AUTO-RTO: 0 /100 WBCS
PH UR STRIP.AUTO: 6 [PH] (ref 4.5–8)
PLATELET # BLD AUTO: 600 THOUSANDS/UL (ref 149–390)
PMV BLD AUTO: 9.7 FL (ref 8.9–12.7)
POTASSIUM SERPL-SCNC: 4.8 MMOL/L (ref 3.5–5.3)
PROT SERPL-MCNC: 7.6 G/DL (ref 6.4–8.4)
PROT UR STRIP-MCNC: >=300 MG/DL
RBC # BLD AUTO: 3.73 MILLION/UL (ref 3.81–5.12)
RBC #/AREA URNS AUTO: ABNORMAL /HPF
SODIUM SERPL-SCNC: 133 MMOL/L (ref 135–147)
SP GR UR STRIP.AUTO: 1.01 (ref 1–1.03)
UROBILINOGEN UR QL STRIP.AUTO: 0.2 E.U./DL
WBC # BLD AUTO: 11.09 THOUSAND/UL (ref 4.31–10.16)
WBC #/AREA URNS AUTO: ABNORMAL /HPF

## 2023-06-03 PROCEDURE — 93005 ELECTROCARDIOGRAM TRACING: CPT

## 2023-06-03 PROCEDURE — 96361 HYDRATE IV INFUSION ADD-ON: CPT

## 2023-06-03 PROCEDURE — 83690 ASSAY OF LIPASE: CPT | Performed by: EMERGENCY MEDICINE

## 2023-06-03 PROCEDURE — 71046 X-RAY EXAM CHEST 2 VIEWS: CPT

## 2023-06-03 PROCEDURE — 96374 THER/PROPH/DIAG INJ IV PUSH: CPT

## 2023-06-03 PROCEDURE — 99285 EMERGENCY DEPT VISIT HI MDM: CPT

## 2023-06-03 PROCEDURE — C9113 INJ PANTOPRAZOLE SODIUM, VIA: HCPCS | Performed by: EMERGENCY MEDICINE

## 2023-06-03 PROCEDURE — 80053 COMPREHEN METABOLIC PANEL: CPT | Performed by: EMERGENCY MEDICINE

## 2023-06-03 PROCEDURE — 36415 COLL VENOUS BLD VENIPUNCTURE: CPT | Performed by: EMERGENCY MEDICINE

## 2023-06-03 PROCEDURE — 71275 CT ANGIOGRAPHY CHEST: CPT

## 2023-06-03 PROCEDURE — 87086 URINE CULTURE/COLONY COUNT: CPT

## 2023-06-03 PROCEDURE — 81001 URINALYSIS AUTO W/SCOPE: CPT

## 2023-06-03 PROCEDURE — 85379 FIBRIN DEGRADATION QUANT: CPT | Performed by: EMERGENCY MEDICINE

## 2023-06-03 PROCEDURE — 84484 ASSAY OF TROPONIN QUANT: CPT | Performed by: EMERGENCY MEDICINE

## 2023-06-03 PROCEDURE — 85025 COMPLETE CBC W/AUTO DIFF WBC: CPT | Performed by: EMERGENCY MEDICINE

## 2023-06-03 PROCEDURE — 96375 TX/PRO/DX INJ NEW DRUG ADDON: CPT

## 2023-06-03 PROCEDURE — 74177 CT ABD & PELVIS W/CONTRAST: CPT

## 2023-06-03 RX ORDER — MAGNESIUM HYDROXIDE/ALUMINUM HYDROXICE/SIMETHICONE 120; 1200; 1200 MG/30ML; MG/30ML; MG/30ML
30 SUSPENSION ORAL ONCE
Status: COMPLETED | OUTPATIENT
Start: 2023-06-03 | End: 2023-06-03

## 2023-06-03 RX ORDER — PANTOPRAZOLE SODIUM 40 MG/1
40 TABLET, DELAYED RELEASE ORAL DAILY
Qty: 20 TABLET | Refills: 0 | Status: SHIPPED | OUTPATIENT
Start: 2023-06-03 | End: 2023-06-23

## 2023-06-03 RX ORDER — ONDANSETRON 4 MG/1
4 TABLET, ORALLY DISINTEGRATING ORAL EVERY 8 HOURS PRN
Qty: 10 TABLET | Refills: 0 | Status: SHIPPED | OUTPATIENT
Start: 2023-06-03 | End: 2023-06-08

## 2023-06-03 RX ORDER — ONDANSETRON 2 MG/ML
4 INJECTION INTRAMUSCULAR; INTRAVENOUS ONCE AS NEEDED
Status: COMPLETED | OUTPATIENT
Start: 2023-06-03 | End: 2023-06-03

## 2023-06-03 RX ORDER — PANTOPRAZOLE SODIUM 40 MG/10ML
40 INJECTION, POWDER, LYOPHILIZED, FOR SOLUTION INTRAVENOUS ONCE
Status: COMPLETED | OUTPATIENT
Start: 2023-06-03 | End: 2023-06-03

## 2023-06-03 RX ADMIN — ALUMINUM HYDROXIDE, MAGNESIUM HYDROXIDE, AND SIMETHICONE 30 ML: 200; 200; 20 SUSPENSION ORAL at 16:21

## 2023-06-03 RX ADMIN — PANTOPRAZOLE SODIUM 40 MG: 40 INJECTION, POWDER, FOR SOLUTION INTRAVENOUS at 16:20

## 2023-06-03 RX ADMIN — SODIUM CHLORIDE 1000 ML: 0.9 INJECTION, SOLUTION INTRAVENOUS at 16:21

## 2023-06-03 RX ADMIN — ONDANSETRON 4 MG: 2 INJECTION INTRAMUSCULAR; INTRAVENOUS at 16:21

## 2023-06-03 RX ADMIN — IOHEXOL 100 ML: 350 INJECTION, SOLUTION INTRAVENOUS at 18:08

## 2023-06-03 NOTE — ED CARE HANDOFF
Emergency Department Sign Out Note        Sign out and transfer of care from Dr Juárez Exon  See Separate Emergency Department note  The patient, Rusty Kruse, was evaluated by the previous provider for cough, dyspnea and upper abdominal pain      Workup Completed:  Labs and CXR    ED Course / Workup Pending (followup):  Pending CT results      HEART Risk Score    Flowsheet Row Most Recent Value   Heart Score Risk Calculator    History 0 Filed at: 06/03/2023 1736   ECG 0 Filed at: 06/03/2023 1736   Age 1 Filed at: 06/03/2023 1736   Risk Factors 1 Filed at: 06/03/2023 1736   Troponin 0 Filed at: 06/03/2023 1736   HEART Score 2 Filed at: 06/03/2023 1736                PERC Rule for PE    Flowsheet Row Most Recent Value   PERC Rule for PE    Age >=50 1 Filed at: 06/03/2023 1709   HR >=100 0 Filed at: 06/03/2023 1709   O2 Sat on room air < 95% 0 Filed at: 06/03/2023 1709   History of PE or DVT 0 Filed at: 06/03/2023 1709   Recent trauma or surgery 0 Filed at: 06/03/2023 1709   Hemoptysis 0 Filed at: 06/03/2023 1709   Exogenous estrogen 0 Filed at: 06/03/2023 1709   Unilateral leg swelling 0 Filed at: 06/03/2023 1709   PERC Rule for PE Results 1 Filed at: 06/03/2023 1709              Wells' Criteria for PE    Flowsheet Row Most Recent Value   Wells' Criteria for PE    Clinical signs and symptoms of DVT 0 Filed at: 06/03/2023 1709   PE is primary diagnosis or equally likely 3 Filed at: 06/03/2023 1709   HR >100 0 Filed at: 06/03/2023 1709   Immobilization at least 3 days or Surgery in the previous 4 weeks 0 Filed at: 06/03/2023 1709   Previous, objectively diagnosed PE or DVT 0 Filed at: 06/03/2023 1709   Hemoptysis 0 Filed at: 06/03/2023 1709   Malignancy with treatment within 6 months or palliative 0 Filed at: 06/03/2023 1709   Wells' Criteria Total 3 Filed at: 06/03/2023 1709                ED Course as of 06/03/23 2024   Sat Jun 03, 2023   1936 PE Study with CT Abdomen and Pelvis with contrast  1 ) No pulmonary embolism  No acute process in the chest, abdomen, and pelvis identified  2 ) Thickened endometrium  Recommend nonemergent follow-up pelvic ultrasound within 3 months - WILL REFER TO OBGYN FOR FURTHER EVALUATION  3 ) Pulmonary nodules measuring up to 5 mm  Based on current Fleischner Society 2017 Guidelines on incidental pulmonary nodule, no routine follow-up is needed if the patient is low risk  If the patient is high risk, optional follow-up chest CT at 12 months can be considered - PT WITHOUT HISTORY OF TOBACCO USE, WILL REFER TO PCP FOR FURTHER MONITORING   1957 WBC, UA(!): 30-50  Without bacteria  Pt without UTI symptoms, will send for culture  1958 Pt and  updated on results ( provided translation)  Suspect underlying viral illness contributing to pt's symptoms, recommend continued symptomatic treatment  Also discussed incidental findings on CT scan and appropriate follow up  Pt lives primarily in [facility] and will follow up with PCP there  Procedures  Medical Decision Making  Amount and/or Complexity of Data Reviewed  Labs: ordered  Decision-making details documented in ED Course  Radiology: ordered  Decision-making details documented in ED Course  Risk  OTC drugs  Prescription drug management                Disposition  Final diagnoses:   Generalized weakness   Epigastric pain   Gastritis   Nausea   Thickened endometrium   Pulmonary nodule     Time reflects when diagnosis was documented in both MDM as applicable and the Disposition within this note     Time User Action Codes Description Comment    6/3/2023  4:17 PM Clorinda Krishna Add [R53 1] Generalized weakness     6/3/2023  6:35 PM Clorinda Krishna Add [R10 13] Epigastric pain     6/3/2023  6:36 PM Clorinda Krishna Add [K29 70] Gastritis     6/3/2023  6:37 PM Clorinda Krishna Add [R11 0] Nausea     6/3/2023  7:39 PM Nicole, Demetria Add [R93 89] Thickened endometrium     6/3/2023  7:40 PM Dalzell, Demetria Add [R91 1] Pulmonary nodule       ED Disposition     ED Disposition   Discharge    Condition   Stable    Date/Time   Sat Nolberto 3, 2023  7:39 PM    Comment   Jhoan Jones discharge to home/self care                 Follow-up Information     Follow up With Specialties Details Why Contact Info Additional [address] [facility] Schedule an appointment as soon as possible for a visit   [address], [address] [facility] [address]  [address], [address], [address] [place], [place], [address]    [facility] Obstetrics and Gynecology Schedule an appointment as soon as possible for a visit   [address] [facility] [address] [facility] [address]  [address], [address], [address] [place], [place], [address] [phone]        Discharge Medication List as of 6/3/2023  7:43 PM      START taking these medications    Details   ondansetron (ZOFRAN-ODT) 4 mg disintegrating tablet Take 1 tablet (4 mg total) by mouth every 8 (eight) hours as needed for nausea or vomiting for up to 5 days, Starting Sat 6/3/2023, Until Thu 6/8/2023 at 2359, Normal      pantoprazole (PROTONIX) 40 mg tablet Take 1 tablet (40 mg total) by mouth daily for 20 days, Starting Sat 6/3/2023, Until Fri 6/23/2023, Normal         CONTINUE these medications which have NOT CHANGED    Details   !! acetaminophen (TYLENOL) 500 mg tablet Take 1 tablet (500 mg total) by mouth every 6 (six) hours as needed for mild pain, Starting Fri 12/31/2021, Normal      !! acetaminophen (TYLENOL) 500 mg tablet Take 1 tablet (500 mg total) by mouth every 6 (six) hours as needed for mild pain, Starting Fri 1/27/2023, Normal      !! albuterol (Proventil HFA) 90 mcg/act inhaler Inhale 1-2 puffs every 6 (six) hours as needed for wheezing, Starting Fri 1/27/2023, Print      !! albuterol (PROVENTIL HFA,VENTOLIN HFA) 90 mcg/act inhaler Inhale 2 puffs every 4 (four) hours as needed for wheezing, Starting Fri 12/31/2021, Normal      benzonatate (TESSALON PERLES) 100 mg capsule Take 1 capsule (100 mg total) by mouth every 8 (eight) hours, Starting Fri 1/27/2023, Print      diclofenac sodium (VOLTAREN) 50 mg EC tablet Take 1 tablet (50 mg total) by mouth 2 (two) times a day, Starting Sun 1/1/2023, Normal      fluticasone (FLONASE) 50 mcg/act nasal spray 1 spray into each nostril daily, Starting Fri 12/31/2021, Normal      !! ibuprofen (MOTRIN) 400 mg tablet Take 1 tablet (400 mg total) by mouth every 6 (six) hours as needed for mild pain, Starting Fri 12/31/2021, Normal      !! ibuprofen (MOTRIN) 400 mg tablet Take 1 tablet (400 mg total) by mouth every 6 (six) hours as needed for mild pain, Starting Fri 1/27/2023, Normal      menthol-cetylpyridinium (CEPACOL) 3 MG lozenge Take 1 lozenge (3 mg total) by mouth as needed for sore throat, Starting Fri 12/31/2021, Normal       !! - Potential duplicate medications found  Please discuss with provider  No discharge procedures on file         ED Provider  Electronically Signed by     Toyin Fernandez DO  06/03/23 2024

## 2023-06-03 NOTE — DISCHARGE INSTRUCTIONS
Mohr tomografía computarizada muestra varios hallazgos incidentales:  1 ) Endometrio engrosado: esto es parte de mohr útero y necesitará jesus evaluación adicional con un ultrasonido  Puede hacer un seguimiento con ginecología para esto  2 ) Nódulos pulmonares: puede hacer un seguimiento con mohr médico de [place] para un control adicional   Sin antecedentes familiares significativos o antecedentes personales de consumo de tabaco, no se necesita más trabajo  Your CT scan shows several incidental findings:  1 ) Thickened endometrium - this is part of your uterus and will need further evaluation with an ultrasound  You can follow up with gynecology for this  2 ) Pulmonary nodules - you can follow up with your family doctor for further monitoring  Without significant family history or personal history of tobacco use no further work up is needed

## 2023-06-03 NOTE — ED PROVIDER NOTES
History  Chief Complaint   Patient presents with   • Weakness - Generalized     Reports generlized weakness, trouble breathing 2-3 weeks seen for same x1 week ago not getting any better       History provided by:  Patient   used: No    Fatigue  Severity:  Moderate  Onset quality:  Gradual  Duration:  2 weeks  Timing:  Intermittent  Progression:  Waxing and waning  Chronicity:  New  Context comment:  Cough, recently treated with Z-pack, comes with epigastric discomfort, nausea, decreased appetite, persistent cough  Relieved by:  Nothing  Worsened by:  Nothing  Ineffective treatments:  None tried  Associated symptoms: nausea    Associated symptoms: no abdominal pain, no chest pain, no cough, no diarrhea, no difficulty walking, no dizziness, no dysuria, no fever, no headaches, no loss of consciousness, no near-syncope, no shortness of breath, no stroke symptoms and no vomiting    Nausea:     Severity:  Moderate    Onset quality:  Gradual    Duration:  1 week    Timing:  Intermittent    Progression:  Waxing and waning  Risk factors comment:  Asthma      Prior to Admission Medications   Prescriptions Last Dose Informant Patient Reported?  Taking?   acetaminophen (TYLENOL) 500 mg tablet   No No   Sig: Take 1 tablet (500 mg total) by mouth every 6 (six) hours as needed for mild pain   acetaminophen (TYLENOL) 500 mg tablet   No No   Sig: Take 1 tablet (500 mg total) by mouth every 6 (six) hours as needed for mild pain   albuterol (PROVENTIL HFA,VENTOLIN HFA) 90 mcg/act inhaler   No No   Sig: Inhale 2 puffs every 4 (four) hours as needed for wheezing   albuterol (Proventil HFA) 90 mcg/act inhaler   No No   Sig: Inhale 1-2 puffs every 6 (six) hours as needed for wheezing   benzonatate (TESSALON PERLES) 100 mg capsule   No No   Sig: Take 1 capsule (100 mg total) by mouth every 8 (eight) hours   diclofenac sodium (VOLTAREN) 50 mg EC tablet   No No   Sig: Take 1 tablet (50 mg total) by mouth 2 (two) times a day   fluticasone (FLONASE) 50 mcg/act nasal spray   No No   Si spray into each nostril daily   ibuprofen (MOTRIN) 400 mg tablet   No No   Sig: Take 1 tablet (400 mg total) by mouth every 6 (six) hours as needed for mild pain   ibuprofen (MOTRIN) 400 mg tablet   No No   Sig: Take 1 tablet (400 mg total) by mouth every 6 (six) hours as needed for mild pain   menthol-cetylpyridinium (CEPACOL) 3 MG lozenge   No No   Sig: Take 1 lozenge (3 mg total) by mouth as needed for sore throat      Facility-Administered Medications: None       Past Medical History:   Diagnosis Date   • Asthma        Past Surgical History:   Procedure Laterality Date   •  SECTION         History reviewed  No pertinent family history  I have reviewed and agree with the history as documented  E-Cigarette/Vaping   • E-Cigarette Use Never User      E-Cigarette/Vaping Substances     Social History     Tobacco Use   • Smoking status: Never   • Smokeless tobacco: Never   Vaping Use   • Vaping Use: Never used   Substance Use Topics   • Alcohol use: Never   • Drug use: Never       Review of Systems   Constitutional: Positive for fatigue  Negative for chills and fever  HENT: Negative for facial swelling, sore throat and trouble swallowing  Eyes: Negative for pain and visual disturbance  Respiratory: Negative for cough and shortness of breath  Cardiovascular: Negative for chest pain, leg swelling and near-syncope  Gastrointestinal: Positive for nausea  Negative for abdominal pain, diarrhea and vomiting  Genitourinary: Negative for dysuria and flank pain  Musculoskeletal: Negative for back pain, neck pain and neck stiffness  Skin: Negative for pallor and rash  Allergic/Immunologic: Negative for environmental allergies and immunocompromised state  Neurological: Negative for dizziness, loss of consciousness and headaches  Hematological: Negative for adenopathy  Does not bruise/bleed easily     Psychiatric/Behavioral: Negative for agitation and behavioral problems  All other systems reviewed and are negative  Physical Exam  Physical Exam  Vitals and nursing note reviewed  Constitutional:       General: She is not in acute distress  Appearance: She is well-developed  HENT:      Head: Normocephalic and atraumatic  Eyes:      Extraocular Movements: Extraocular movements intact  Cardiovascular:      Rate and Rhythm: Normal rate and regular rhythm  Heart sounds: Normal heart sounds  Pulmonary:      Effort: Pulmonary effort is normal  No respiratory distress  Breath sounds: Normal breath sounds  Abdominal:      Palpations: Abdomen is soft  Tenderness: There is no abdominal tenderness  There is no guarding or rebound  Musculoskeletal:         General: Normal range of motion  Cervical back: Normal range of motion and neck supple  Skin:     General: Skin is warm and dry  Neurological:      General: No focal deficit present  Mental Status: She is alert and oriented to person, place, and time  Cranial Nerves: No cranial nerve deficit  Motor: No weakness        Coordination: Coordination normal    Psychiatric:         Mood and Affect: Mood normal          Behavior: Behavior normal          Vital Signs  ED Triage Vitals [06/03/23 1535]   Temperature Pulse Respirations Blood Pressure SpO2   98 3 °F (36 8 °C) 95 18 126/75 98 %      Temp Source Heart Rate Source Patient Position - Orthostatic VS BP Location FiO2 (%)   Oral Monitor Sitting Right arm --      Pain Score       No Pain           Vitals:    06/03/23 1535 06/03/23 1815   BP: 126/75 130/67   Pulse: 95 104   Patient Position - Orthostatic VS: Sitting          Visual Acuity      ED Medications  Medications   pantoprazole (PROTONIX) injection 40 mg (40 mg Intravenous Given 6/3/23 1620)   sodium chloride 0 9 % bolus 1,000 mL (1,000 mL Intravenous New Bag 6/3/23 1621)   ondansetron (ZOFRAN) injection 4 mg (4 mg Intravenous Given 6/3/23 1621)   aluminum-magnesium hydroxide-simethicone (MYLANTA) oral suspension 30 mL (30 mL Oral Given 6/3/23 1621)   iohexol (OMNIPAQUE) 350 MG/ML injection (SINGLE-DOSE) 100 mL (100 mL Intravenous Given 6/3/23 1808)       Diagnostic Studies  Results Reviewed     Procedure Component Value Units Date/Time    Comprehensive metabolic panel [[id number]]  (Abnormal) Collected: 06/03/23 1619    Lab Status: Final result Specimen: Blood from Arm, Right Updated: 06/03/23 1724     Sodium 133 mmol/L      Potassium 4 8 mmol/L      Chloride 98 mmol/L      CO2 29 mmol/L      ANION GAP 6 mmol/L      BUN 24 mg/dL      Creatinine 1 52 mg/dL      Glucose 86 mg/dL      Calcium 9 2 mg/dL      Corrected Calcium 9 8 mg/dL      AST 17 U/L      ALT 15 U/L      Alkaline Phosphatase 44 U/L      Total Protein 7 6 g/dL      Albumin 3 3 g/dL      Total Bilirubin 0 24 mg/dL      eGFR 38 ml/min/1 73sq m     Narrative:      [place] guidelines for Chronic Kidney Disease (CKD):   •  Stage 1 with normal or high GFR (GFR > 90 mL/min/1 73 square meters)  •  Stage 2 Mild CKD (GFR = 60-89 mL/min/1 73 square meters)  •  Stage 3A Moderate CKD (GFR = 45-59 mL/min/1 73 square meters)  •  Stage 3B Moderate CKD (GFR = 30-44 mL/min/1 73 square meters)  •  Stage 4 Severe CKD (GFR = 15-29 mL/min/1 73 square meters)  •  Stage 5 End Stage CKD (GFR <15 mL/min/1 73 square meters)  Note: GFR calculation is accurate only with a steady state creatinine    Lipase [[id number]]  (Normal) Collected: 06/03/23 1619    Lab Status: Final result Specimen: Blood from Arm, Right Updated: 06/03/23 1724     Lipase 17 u/L     HS Troponin 0hr (reflex protocol) [[id number]]  (Normal) Collected: 06/03/23 1619    Lab Status: Final result Specimen: Blood from Arm, Right Updated: 06/03/23 1724     hs TnI 0hr 4 ng/L     D-Dimer [[id number]]  (Abnormal) Collected: 06/03/23 1619    Lab Status: Final result Specimen: Blood from Arm, Right Updated: 06/03/23 3988 D-Dimer, Quant 2 44 ug/ml FEU     Narrative: In the evaluation for possible pulmonary embolism, in the appropriate (Well's Score of 4 or less) patient, the age adjusted d-dimer cutoff for this patient can be calculated as:    Age x 0 01 (in ug/mL) for Age-adjusted D-dimer exclusion threshold for a patient over 50 years  CBC and differential [[id number]]  (Abnormal) Collected: 06/03/23 1619    Lab Status: Final result Specimen: Blood from Arm, Right Updated: 06/03/23 1652     WBC 11 09 Thousand/uL      RBC 3 73 Million/uL      Hemoglobin 10 9 g/dL      Hematocrit 33 8 %      MCV 91 fL      MCH 29 2 pg      MCHC 32 2 g/dL      RDW 12 3 %      MPV 9 7 fL      Platelets 522 Thousands/uL      nRBC 0 /100 WBCs      Neutrophils Relative 50 %      Immat GRANS % 1 %      Lymphocytes Relative 28 %      Monocytes Relative 6 %      Eosinophils Relative 14 %      Basophils Relative 1 %      Neutrophils Absolute 5 67 Thousands/µL      Immature Grans Absolute 0 07 Thousand/uL      Lymphocytes Absolute 3 11 Thousands/µL      Monocytes Absolute 0 64 Thousand/µL      Eosinophils Absolute 1 55 Thousand/µL      Basophils Absolute 0 05 Thousands/µL                  XR chest 2 views    (Results Pending)   PE Study with CT Abdomen and Pelvis with contrast    (Results Pending)              Procedures  Procedures         ED Course  ED Course as of 06/03/23 1845   Sat Jun 03, 2023   1651 Chest x-ray independently reviewed, no significant acute abnormality noted, official report pending  1654 WBC(!): 11 09   1654 Hemoglobin(!): 10 9   1654 Platelet Count(!): 776  CBC noted  1735 Sodium(!): 133   1735 Potassium: 4 8   1735 BUN: 24   1735 Creatinine(!): 1 52  CMP reviewed, mild NICCI, IVF given  1735 hs TnI 0hr: 4   1735 Lipase: 17  Trop, Lipase wnl    1736 Patient and SO other who is translating for patient, informed about initial w/u results, elevated Dimer, would get CTA Chest PE with Abd/pelvis as patient also with abd pain  1850: Patient signed out to Dr eMry Alanis for follow up of CT results          HEART Risk Score    Flowsheet Row Most Recent Value   Heart Score Risk Calculator    History 0 Filed at: 06/03/2023 1736   ECG 0 Filed at: 06/03/2023 1736   Age 1 Filed at: 06/03/2023 1736   Risk Factors 1 Filed at: 06/03/2023 1736   Troponin 0 Filed at: 06/03/2023 1736   HEART Score 2 Filed at: 06/03/2023 1736                PERC Rule for PE    Flowsheet Row Most Recent Value   PERC Rule for PE    Age >=50 1 Filed at: 06/03/2023 1709   HR >=100 0 Filed at: 06/03/2023 1709   O2 Sat on room air < 95% 0 Filed at: 06/03/2023 1709   History of PE or DVT 0 Filed at: 06/03/2023 1709   Recent trauma or surgery 0 Filed at: 06/03/2023 1709   Hemoptysis 0 Filed at: 06/03/2023 1709   Exogenous estrogen 0 Filed at: 06/03/2023 1709   Unilateral leg swelling 0 Filed at: 06/03/2023 1709   PERC Rule for PE Results 1 Filed at: 06/03/2023 1709                  Wells' Criteria for PE    Flowsheet Row Most Recent Value   Wells' Criteria for PE    Clinical signs and symptoms of DVT 0 Filed at: 06/03/2023 1709   PE is primary diagnosis or equally likely 3 Filed at: 06/03/2023 1709   HR >100 0 Filed at: 06/03/2023 1709   Immobilization at least 3 days or Surgery in the previous 4 weeks 0 Filed at: 06/03/2023 1709   Previous, objectively diagnosed PE or DVT 0 Filed at: 06/03/2023 1709   Hemoptysis 0 Filed at: 06/03/2023 1709   Malignancy with treatment within 6 months or palliative 0 Filed at: 06/03/2023 1709   Wells' Criteria Total 3 Filed at: 06/03/2023 1709                Medical Decision Making  Is a 61-year-old female, history of asthma, comes in with complaints of persistent cough, generalized weakness, no epigastric discomfort, nausea, patient symptoms have been going on for past 2 3 weeks, patient was recently seen in the ER last week, was treated with azithromycin, started with upper abdominal discomfort, nausea, decreased appetite, generalized weakness, still has persistent cough, chest pain, does report shortness of breath  On exam, patient is conscious, alert, vital signs stable, no acute distress, lungs are clear to auscultation, abdomen is soft, nondistended, nontender, negative Osborn sign, normal neuro exam   Nutrition diagnosis: Nonspecific weakness, rule out anemia, dehydration, electrolyte imbalance, pneumonia, gastritis, GERD, PE due to recent illness and shortness of breath, we will check labs, EKG, D-dimer, x-ray, give GI cocktail, Protonix  Generalized weakness: acute illness or injury  Amount and/or Complexity of Data Reviewed  Labs: ordered  Decision-making details documented in ED Course  Radiology: ordered and independent interpretation performed  Decision-making details documented in ED Course  ECG/medicine tests: ordered and independent interpretation performed  Decision-making details documented in ED Course  Risk  OTC drugs  Prescription drug management            Disposition  Final diagnoses:   Generalized weakness   Epigastric pain   Gastritis   Nausea     Time reflects when diagnosis was documented in both MDM as applicable and the Disposition within this note     Time User Action Codes Description Comment    6/3/2023  4:17 PM Gill Sal Add [R53 1] Generalized weakness     6/3/2023  6:35 PM Gill [place] Add [R10 13] Epigastric pain     6/3/2023  6:36 PM Gill Sal Add [K29 70] Gastritis     6/3/2023  6:37 PM Gill Sal Add [R11 0] Nausea       ED Disposition     None      Follow-up Information     Follow up With Specialties Details Why Contact Info Additional [facility] Schedule an appointment as soon as possible for a visit   [address], [address] [facility] [id number]  [address] [place] [address], [address], [address] [place], [place], [address]          Patient's Medications   Discharge Prescriptions    ONDANSETRON (ZOFRAN-ODT) 4 MG DISINTEGRATING TABLET    Take 1 tablet (4 mg total) by mouth every 8 (eight) hours as needed for nausea or vomiting for up to 5 days       Start Date: 6/3/2023  End Date: 6/8/2023       Order Dose: 4 mg       Quantity: 10 tablet    Refills: 0    PANTOPRAZOLE (PROTONIX) 40 MG TABLET    Take 1 tablet (40 mg total) by mouth daily for 20 days       Start Date: 6/3/2023  End Date: 6/23/2023       Order Dose: 40 mg       Quantity: 20 tablet    Refills: 0       No discharge procedures on file      PDMP Review     None          ED Provider  Electronically Signed by           Dat Boyd MD  06/04/23 3938

## 2023-06-04 LAB
ATRIAL RATE: 87 BPM
P AXIS: 59 DEGREES
PR INTERVAL: 148 MS
QRS AXIS: 67 DEGREES
QRSD INTERVAL: 80 MS
QT INTERVAL: 354 MS
QTC INTERVAL: 425 MS
T WAVE AXIS: 62 DEGREES
VENTRICULAR RATE: 87 BPM

## 2023-06-04 PROCEDURE — 93010 ELECTROCARDIOGRAM REPORT: CPT | Performed by: INTERNAL MEDICINE

## 2023-06-05 LAB — BACTERIA UR CULT: ABNORMAL

## 2023-06-19 ENCOUNTER — TELEPHONE (OUTPATIENT)
Dept: OTHER | Facility: HOSPITAL | Age: 58
End: 2023-06-19

## 2023-06-19 DIAGNOSIS — N39.0 URINARY TRACT INFECTION WITHOUT HEMATURIA, SITE UNSPECIFIED: Primary | ICD-10-CM

## 2023-06-19 RX ORDER — AMOXICILLIN 500 MG/1
500 CAPSULE ORAL EVERY 12 HOURS SCHEDULED
Qty: 14 CAPSULE | Refills: 0 | Status: SHIPPED | OUTPATIENT
Start: 2023-06-19 | End: 2023-06-26

## 2023-06-19 NOTE — TELEPHONE ENCOUNTER
Called and spoke with patient  She continues to have some nausea and upset stomach  Will treat UTi      Antibiotic sent to pharmacy